# Patient Record
Sex: MALE | Race: WHITE
[De-identification: names, ages, dates, MRNs, and addresses within clinical notes are randomized per-mention and may not be internally consistent; named-entity substitution may affect disease eponyms.]

---

## 2017-03-24 NOTE — EDM.PDOC
[id number]   4d


LEFT SIDE ABD PAIN


Time Seen by Provider: 03/24/17 16:50


Source of Information: Reports: Patient, Family


History Limitations: Reports: No limitations





- History of Present Illness


INITIAL COMMENTS - FREE TEXT/NARRATIVE: 





81-year-old male arrives with acute pain in his lower abdomen and left flank.  

It started within the last 2 hours.  He has nausea and vomiting.  No fevers or 

chills, no previous symptoms.


Onset: sudden


Duration: Hour(s): (Within the last 2 hours)


Location: Reports: abdomen (Left side), back


Severity: severe


Improves with: Reports: None


Worsens with: Reports: None


Associated Symptoms: Reports: nausea/vomiting.  Denies: shortness of breath





- Related Data


 Allergies











Allergy/AdvReac Type Severity Reaction Status Date / Time


 


atorvastatin calcium Allergy  Other Verified 05/20/15 08:10





[From Lipitor]     


 


codeine Allergy  Anaphylactic Verified 04/15/15 08:02





   Shock  


 


influenza virus vaccine, Allergy  Cannot Verified 04/15/15 08:02





specific   Remember  





[Influenza Virus     





Vacc,Specific]     


 


perfumes Allergy  Other Uncoded 05/20/15 08:10











Home Meds: 


 Home Meds





Finasteride [Proscar] 5 mg PO DAILY 08/19/14 [History]


Pioglitazone HCl 30 mg PO ACBRKBED 08/19/14 [History]


Simvastatin [Zocor] 80 mg PO DAILY 08/19/14 [History]


Aspirin 1 tab PO 03/24/17 [History]











Past Medical History


Other HEENT History: wears glasses





- Past Surgical History


Other Musculoskeletal Surgeries/Procedures:: bilateral tendons replaced on knees





Social & Family History





- Tobacco Use


Smoking Status *Q: Never Smoker


Years of Tobacco use: 32


Second Hand Smoke Exposure: No





- Alcohol Use


Days Per Week of Alcohol Use: 0





- Recreational Drug Use


Recreational Drug Use: No





ED ROS GENERAL





- Review of Systems


Review Of Systems: See Below


Constitutional: Denies: fever, chills


HEENT: Reports: No symptoms


Respiratory: Denies: Shortness of Breath


Cardiovascular: Denies: Chest pain


GI/Abdominal: Reports: Abdominal pain, Nausea, Vomiting


Skin: Reports: no symptoms


Neurological: Reports: No Symptoms


Psychiatric: Reports: Anxiety





ED EXAM, GENERAL





- Physical Exam


Exam: See Below


Exam Limited By: No limitations


General Appearance: alert, anxious, moderate distress


Eye Exam: bilateral eye: normal inspection


Respiratory/Chest: no respiratory distress, lungs clear


GI/Abdominal: soft, non tender


Neurological: alert, oriented


Psychiatric: anxious


Skin Exam: Warm, Dry





Course





- Vital Signs


Last Recorded V/S: 


 Last Vital Signs











Temp  98.6 F   03/24/17 16:46


 


Pulse  58 L  03/24/17 16:46


 


Resp  15   03/24/17 16:46


 


BP  164/77 H  03/24/17 16:46


 


Pulse Ox  96   03/24/17 16:46














- Orders/Labs/Meds


Orders: 


 Active Orders 24 hr











 Category Date Time Status


 


 Abdomen Pelvis wo Cont [CT] Stat Exams  03/24/17 16:30 Taken











Meds: 


Medications














Discontinued Medications














Generic Name Dose Route Start Last Admin





  Trade Name Freq  PRN Reason Stop Dose Admin


 


Sodium Chloride  1,000 mls @ 500 mls/hr  03/24/17 16:30  03/24/17 16:55





  Normal Saline  IV   500 mls/hr





  ASDIRECTED KIRSTIE   Administration


 


Ketorolac Tromethamine  30 mg  03/24/17 16:30  03/24/17 16:54





  Toradol  IVPUSH  03/24/17 16:31  30 mg





  ONETIME ONE   Administration


 


Ondansetron HCl  4 mg  03/24/17 16:34  03/24/17 16:53





  Zofran  IVPUSH  03/24/17 16:35  4 mg





  ONETIME ONE   Administration


 


Ondansetron HCl  Confirm  03/24/17 16:34  





  Zofran  Administered  03/24/17 16:35  





  Dose   





  4 mg   





  .ROUTE   





  .STK-MED ONE   














- Re-Assessments/Exams


Free Text/Narrative Re-Assessment/Exam: 





03/24/17 17:40


This patient's symptoms appeared to be classic for a renal stone.  1 L of fluid 

was started, 30 mg of Toradol IV given, and the patient was sent back for a CT 

of his abdomen and pelvis without contrast.  This confirmed a very small 1-2 mm 

distal left ureteral stone which likely has passed this patient's symptoms 

resolved.  Patient was observed for another 30 minutes and symptoms did not 

return.  He was given 6 hydrocodone to take home in case symptoms started 

returning, encouraged to drink a lot of water and return if needed.





Departure





- Departure


Time of Disposition: 17:47


Disposition: Home, Self-Care 01


Condition: good


Clinical Impression: 


 Ureteric colic





Instructions:  Kidney Stones, Easy-to-Read


Referrals: 


Ranulfo Springer PA-C [Primary Care Provider] - 


Forms:  ED Department Discharge


Care Plan Goals: 


Drink lots of water to prevent further stones from developing, take ibuprofen 

along with hydrocodone for pain if pain starts to recur and return anytime if 

worsening or concerns





- My Orders


Last 24 Hours: 


My Active Orders





03/24/17 16:30


Abdomen Pelvis wo Cont [CT] Stat 














- Assessment/Plan


Last 24 Hours: 


My Active Orders





03/24/17 16:30


Abdomen Pelvis wo Cont [CT] Stat

## 2021-05-01 ENCOUNTER — HOSPITAL ENCOUNTER (EMERGENCY)
Dept: HOSPITAL 11 - JP.ED | Age: 86
Discharge: HOME | End: 2021-05-01
Payer: MEDICARE

## 2021-05-01 VITALS — HEART RATE: 84 BPM | DIASTOLIC BLOOD PRESSURE: 61 MMHG | SYSTOLIC BLOOD PRESSURE: 170 MMHG

## 2021-05-01 DIAGNOSIS — Z88.5: ICD-10-CM

## 2021-05-01 DIAGNOSIS — Z88.7: ICD-10-CM

## 2021-05-01 DIAGNOSIS — S00.81XA: ICD-10-CM
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
## 2021-05-01 DIAGNOSIS — S00.01XA: ICD-10-CM

## 2021-05-01 DIAGNOSIS — Z79.899: ICD-10-CM

## 2021-05-01 DIAGNOSIS — Z91.048: ICD-10-CM

## 2021-05-01 DIAGNOSIS — Z79.82: ICD-10-CM

## 2021-05-01 DIAGNOSIS — S60.211A: Primary | ICD-10-CM

## 2021-05-01 DIAGNOSIS — Z88.8: ICD-10-CM

## 2021-05-01 NOTE — EDM.PDOC
ED HPI GENERAL MEDICAL PROBLEM





- General


Chief Complaint: Head Injury


Stated Complaint: FELL HIT FOREHEAD AND RIGHT WRIST


Time Seen by Provider: 05/01/21 15:00


Source of Information: Reports: Patient, Family


History Limitations: Reports: No Limitations





- History of Present Illness


INITIAL COMMENTS - FREE TEXT/NARRATIVE: 





85-year-old male was walking out of a local restaurant when he stumbled and 

tripped, scraping his right forehead and lateral eyebrow on the ground and 

jamming his right wrist into the ground as well.  No loss of consciousness, no 

nausea or vomiting but is got a significant scrape on his right lateral forehead

and pain in his wrist that he is concerned about.  He is able to ambulate 

without difficulty, no visual concerns, no headache.  His tetanus is current.


Onset: Sudden


Duration: Hour(s): (1 hour ago)


Location: Reports: Head, Face, Upper Extremity, Right


Associated Symptoms: Reports: No Other Symptoms, Other (Chronic back pain).  

Denies: Confusion, Chest Pain, Cough, Malaise, Nausea/Vomiting, Shortness of 

Breath, Weakness





- Related Data


                                    Allergies











Allergy/AdvReac Type Severity Reaction Status Date / Time


 


atorvastatin calcium Allergy  Other Verified 05/20/15 08:10





[From Lipitor]     


 


codeine Allergy  Anaphylactic Verified 04/15/15 08:02





   Shock  


 


influenza virus vaccine, Allergy  Cannot Verified 04/15/15 08:02





specific   Remember  





[Influenza Virus     





Vacc,Specific]     


 


perfumes Allergy  Other Uncoded 05/20/15 08:10











Home Meds: 


                                    Home Meds





Pioglitazone HCl 30 mg PO ACBRKBED 08/19/14 [History]


Simvastatin [Zocor] 80 mg PO DAILY 08/19/14 [History]


Aspirin 1 tab PO DAILY 03/24/17 [History]











Past Medical History


Other HEENT History: wears glasses





- Past Surgical History


Other Musculoskeletal Surgeries/Procedures:: bilateral tendons replaced on knees





ED ROS GENERAL





- Review of Systems


Review Of Systems: See Below


Constitutional: Denies: Fever, Chills


Respiratory: Denies: Shortness of Breath


Cardiovascular: Denies: Chest Pain


GI/Abdominal: Denies: Nausea, Vomiting


Musculoskeletal: Reports: Back Pain, Other (Right wrist pain)


Neurological: Reports: Other (See HPI)





ED EXAM, HEAD INJURY





- Physical Exam


Exam: See Below


Exam Limited By: No Limitations


General Appearance: Alert, No Apparent Distress


Head: Other (Patient has a 2 x 4 cm fairly deep abrasion above and to the right 

of the right eyebrow, but no laceration.  A few very superficial abrasions 

around the cheeks bilaterally.)


Eyes: Bilateral Eye: EOMI, Normal Inspection


Neck: Non-Tender


Respiratory: No Respiratory Distress


Extremities: Other (Patient has ecchymosis around the knuckle of the middle 

finger on the right hand but no bony tenderness.  He does have bony tenderness 

to palpation over the distal radius but no deformity.)


Neurologic: No Motor/Sensory Deficits, Normal Mood/Affect, Oriented x 3





- [place] Coma Score


Best Eye Response ([place]): (4) Open Spontaneously


Best Verbal Response ([place]): (5) Oriented


Best Motor Response ([place]): (6) Obeys Commands





Course





- Vital Signs


Last Recorded V/S: 


                                Last Vital Signs











Temp  97.6 F   05/01/21 14:56


 


Pulse  84   05/01/21 14:56


 


Resp  20   05/01/21 14:56


 


BP  170/61 H  05/01/21 14:56


 


Pulse Ox  95   05/01/21 14:56














- Orders/Labs/Meds


Orders: 


                               Active Orders 24 hr











 Category Date Time Status


 


 Wrist Comp Min 3V Rt [CR] Stat Exams  05/01/21 15:09 Taken











Meds: 


Medications














Discontinued Medications














Generic Name Dose Route Start Last Admin





  Trade Name Blaire  PRN Reason Stop Dose Admin


 


Bacitracin  1 dose  05/01/21 15:08  05/01/21 15:16





  Bacitracin Oint 1 Gm U/D Packet  TOP  05/01/21 15:09  1 dose





  ONETIME ONE   Administration














- Re-Assessments/Exams


Free Text/Narrative Re-Assessment/Exam: 





05/01/21 17:19


An x-ray of the right wrist was obtained that shows no fracture.  His tetanus is

 current.  The deeper abrasion on his right forehead was covered with bacitracin

 and dressings were applied.  Encouraged him to wrap his right wrist for support

 but there is no fracture, he should increase activity as tolerated.





Departure





- Departure


Time of Disposition: 15:36


Disposition: Home, Self-Care 01


Clinical Impression: 


Scalp abrasion


Qualifiers:


 Encounter type: initial encounter Qualified Code(s): S00.01XA - Abrasion of 

scalp, initial encounter





Contusion of right wrist


Qualifiers:


 Encounter type: initial encounter Qualified Code(s): S60.211A - Contusion of 

right wrist, initial encounter








- Discharge Information


Instructions:  Hand Contusion, Easy-to-Read, Abrasion, Easy-to-Read


Referrals: 


PCP,None [Primary Care Provider] - 


Forms:  ED Department Discharge


Care Plan Goals: 


Increase activity as tolerated and keep wounds clean while healing.  Return 

anytime if worsening or concerns.





Sepsis Event Note (ED)





- Focused Exam


Vital Signs: 


                                   Vital Signs











  Temp Pulse Resp BP Pulse Ox


 


 05/01/21 14:56  97.6 F  84  20  170/61 H  95


 


 05/01/21 14:51  97.6 F  84  20  170/61 H  95














- My Orders


Last 24 Hours: 


My Active Orders





05/01/21 15:09


Wrist Comp Min 3V Rt [CR] Stat 














- Assessment/Plan


Last 24 Hours: 


My Active Orders





05/01/21 15:09


Wrist Comp Min 3V Rt [CR] Stat

## 2021-05-03 NOTE — CR
Wrist Comp Min 3V Rt

 

CLINICAL HISTORY: Pain, fall

 

FINDINGS: There is no acute fracture or dislocation within the right wrist.

There is osteoarthritic change most prominent at the first and second

carpometacarpal junctions. There is joint laxity at the first.

 

Impression: No fracture.

 

Moderate osteoarthritic change

## 2021-07-15 ENCOUNTER — HOSPITAL ENCOUNTER (EMERGENCY)
Dept: HOSPITAL 11 - JP.ED | Age: 86
Discharge: HOME | End: 2021-07-15
Payer: MEDICARE

## 2021-07-15 VITALS — SYSTOLIC BLOOD PRESSURE: 181 MMHG | DIASTOLIC BLOOD PRESSURE: 77 MMHG | HEART RATE: 92 BPM
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
## 2021-07-15 DIAGNOSIS — Z88.8: ICD-10-CM

## 2021-07-15 DIAGNOSIS — S40.012A: ICD-10-CM

## 2021-07-15 DIAGNOSIS — S51.012A: Primary | ICD-10-CM
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
## 2021-07-15 DIAGNOSIS — Z88.5: ICD-10-CM

## 2021-07-15 DIAGNOSIS — S20.212A: ICD-10-CM

## 2021-07-15 DIAGNOSIS — Z87.891: ICD-10-CM

## 2021-07-15 DIAGNOSIS — Z79.82: ICD-10-CM

## 2021-07-15 DIAGNOSIS — Z91.048: ICD-10-CM

## 2021-07-15 DIAGNOSIS — S60.212A: ICD-10-CM

## 2021-07-15 DIAGNOSIS — I10: ICD-10-CM

## 2021-07-15 DIAGNOSIS — Z88.7: ICD-10-CM

## 2021-07-15 PROCEDURE — 73110 X-RAY EXAM OF WRIST: CPT

## 2021-07-15 PROCEDURE — 99284 EMERGENCY DEPT VISIT MOD MDM: CPT

## 2021-07-15 PROCEDURE — 99283 EMERGENCY DEPT VISIT LOW MDM: CPT

## 2021-07-15 PROCEDURE — 73030 X-RAY EXAM OF SHOULDER: CPT

## 2021-07-15 PROCEDURE — 71101 X-RAY EXAM UNILAT RIBS/CHEST: CPT

## 2021-07-15 NOTE — EDM.PDOC
ED HPI GENERAL MEDICAL PROBLEM





- General


Chief Complaint: General


Stated Complaint: MEDICAL VIA NORTH


Time Seen by Provider: 07/15/21 18:00


Source of Information: Reports: Patient, EMS


History Limitations: Reports: No Limitations





- History of Present Illness


INITIAL COMMENTS - FREE TEXT/NARRATIVE: 





Patient presents emergency room today status post a fall that occurred at home. 

Patient states that this morning he was gathering some trash on his back porch 

he states that his back porch does not have any rails and is about 2 feet off 

the ground he stepped off the side of the porch and fell onto his left side.  

Patient states that he initially laid there for a few minutes was a little dazed

did not lose consciousness blackout pass out or hit his head he said he landed 

on his left side although he is not really sure exactly how he landed.  Patient 

does state that he has left wrist pain as well as left rib cage and discomfort. 

He states he did okay was able to get up [facility] is scheduled then went on out to the [facility] to dispose of the 

trash she had been gathering him back [facility] and 

when on home said he was able to help his wife get out of the car get back into 

the house but then after that he had increasing pain and muscle spasm and was 

not able to do anything he says that around 1:00 is when he noticed all of that 

because of the continued pain and discomfort he called EMS to come to the 

emergency room for further evaluation.  Of note patient has a resolving 

ecchymosis to his right forehead he states that he fell off the stool at the 

[employer] couple weeks ago and he was seen at that time for that issue she

states that pain is approximately 6-8 out of 10 with movement at rest he says 

that is markedly improved although not gone away completely he does not want to 

use his left upper extremity.





PMH--DM2 (no insulin), HLP, GERD, Obesity, hx TIAs, history of benign positional

vertigo


Meds--pioglitazone omeprazole simvastatin and aspirin


Allergies--Lipitor codeine influenza vaccine and perfumes





Tob--former


EtOH/Drug--denies





COVID immunization--second dose received in April 2021


Onset: Today


Onset Date: 07/15/21


Onset Time: 11:00


  ** Left Wrist


Pain Score (Numeric/FACES): 3





  ** Left Thoracic


Pain Score (Numeric/FACES): 3





- Related Data


                                    Allergies











Allergy/AdvReac Type Severity Reaction Status Date / Time


 


atorvastatin calcium Allergy  Other Verified 07/15/21 18:14





[From Lipitor]     


 


codeine Allergy  Anaphylactic Verified 07/15/21 18:14





   Shock  


 


influenza virus vaccine, Allergy  Cannot Verified 07/15/21 18:14





specific   Remember  





[Influenza Virus     





Vacc,Specific]     


 


perfumes Allergy  Other Uncoded 07/15/21 18:14











Home Meds: 


                                    Home Meds





Pioglitazone HCl 30 mg PO ACBRKBED 08/19/14 [History]


Simvastatin [Zocor] 80 mg PO DAILY 08/19/14 [History]


Aspirin 1 tab PO DAILY 03/24/17 [History]


Omeprazole 40 mg PO ASDIRECTED PRN 07/15/21 [History]











Past Medical History


Other HEENT History: wears glasses





- Past Surgical History


Musculoskeletal Surgical History: Reports: Other (See Below)


Other Musculoskeletal Surgeries/Procedures:: bilateral tendons replaced on knees





Social & Family History





- Tobacco Use


Tobacco Use Status *Q: Former Tobacco User


Used Tobacco, but Quit: Yes


Month/Year Tobacco Last Used: 1983





- Caffeine Use


Caffeine Use: Reports: None





- Recreational Drug Use


Recreational Drug Use: No





ED ROS GENERAL





- Review of Systems


Review Of Systems: Comprehensive ROS is negative, except as noted in HPI.


Constitutional: Reports: No Symptoms


HEENT: Reports: No Symptoms


Respiratory: Reports: No Symptoms


Cardiovascular: Reports: No Symptoms


GI/Abdominal: Reports: No Symptoms


Musculoskeletal: Reports: Shoulder Pain, Arm Pain, Hand Pain, Muscle Stiffness


Skin: Reports: Wound (skin tear left elbow/forearm area)


Neurological: Reports: No Symptoms





ED EXAM, GENERAL





- Physical Exam


Exam: See Below


Exam Limited By: No Limitations


General Appearance: Alert, WD/WN, Moderate Distress


Eye Exam: Bilateral Eye: EOMI, Normal Inspection, PERRL


Ears: Normal External Exam


Nose: Normal Inspection


Throat/Mouth: Normal Inspection, Normal Voice, No Airway Compromise


Head: Atraumatic, Normocephalic, Other (Has noted resolving ecchymosis on the 

right forehead area from a previous fall couple weeks ago)


Neck: Normal Inspection, Supple, Non-Tender, Full Range of Motion


Respiratory/Chest: No Respiratory Distress, Lungs Clear, Normal Breath Sounds.  

No: Chest Non-Tender (With noted anterior left and left lateral chest wall 

tenderness to palpatory exam)


Cardiovascular: Normal Peripheral Pulses, Regular Rate, Rhythm, No Murmur


Peripheral Pulses: 2+: Radial (L), Radial (R)


GI/Abdominal: Normal Bowel Sounds, Soft, Non-Tender, Other (Noted for morbid 

abdominal obesity).  No: No Distention


 (Male) Exam: Deferred


Rectal (Males) Exam: Deferred


Back Exam: Normal Inspection, Full Range of Motion, Other (Patient with left-

sided scapula tenderness to palpatory exam).  No: Paraspinal Tenderness, 

Vertebral Tenderness


Extremities: Normal Capillary Refill, Joint Swelling (Left wrist swelling 

decreased motion as well as noted tenderness), Limited Range of Motion (Left 

shoulder and wrist elbow on the left side is without any restriction in motion 

no pain or discomfort)


Neurological: Alert, Oriented, CN II-XII Intact, Normal Cognition, No 

Motor/Sensory Deficits


Psychiatric: Normal Affect, Normal Mood


Skin Exam: Warm, Dry, Normal Color, Other (Skin tear to left elbow forearm area 

approximately 2 cm in size)





Course





- Vital Signs


Text/Narrative:: 





1919--many readings of plain films to include rib series there is no acute rib 

fracture noted no pneumothorax is noted, left shoulder there is no fracture or 

dislocation noted, left wrist there is sclerotic changes versus a distal radius 

avulsion fracture.  Final radiology reading of these films are pending at this 

time





1920--discussed with patient's today's ER findings to include recommendation for

 anti-inflammatory ice or heat as he finds comfortable and muscle relaxants.  

Will place a left wrist splint if Velcro wrist splint is available we will 

utilize that otherwise we will fabricate one in the emergency room today.  

Patient verbalized understanding regarding films in the radiology will follow up

 with a final reading which may change any plan of care.  It is recommended that

 he follow-up with his primary care provider in the next 2 to 3 days in regards 

to these films and any ongoing care needs.  He may want to call tomorrow to his 

primary care provider to find out final radiology report before the weekend.  

Skin tear care will include antibiotic ointment and cover with a nonstick 

bandage or dressing recommend limited use of adhesive bandages secondary to 

further skin tear proper probability.  Did discuss with patient care on his 

porch may want to have railings put up to decrease further fall potential 

durations patient verbalized understanding agreement will be ready for discharge

 shortly


Last Recorded V/S: 


                                Last Vital Signs











Temp  97.9 F   07/15/21 18:12


 


Pulse  87   07/15/21 18:12


 


Resp  20   07/15/21 18:12


 


BP  178/79 H  07/15/21 18:12


 


Pulse Ox  97   07/15/21 18:12














- Orders/Labs/Meds


Orders: 


                               Active Orders 24 hr











 Category Date Time Status


 


 Ribs 3V wo Chest Lt [CR] Stat Exams  07/15/21 18:27 Taken


 


 Shoulder Comp Lt [CR] Stat Exams  07/15/21 18:25 Taken


 


 Wrist Comp Min 3V Lt [CR] Stat Exams  07/15/21 18:26 Taken











Meds: 


Medications














Discontinued Medications














Generic Name Dose Route Start Last Admin





  Trade Name Blaire  PRN Reason Stop Dose Admin


 


Ketorolac Tromethamine  10 mg  07/15/21 18:35 





  Ketorolac 10 Mg Tab  PO  07/15/21 18:36 





  ONETIME ONE  


 


Methocarbamol  1,000 mg  07/15/21 18:35 





  Methocarbamol 500 Mg Tab  PO  07/15/21 18:36 





  ONETIME ONE  














Departure





- Departure


Time of Disposition: 19:34


Disposition: Home, Self-Care 01


Clinical Impression: 


 Fall (on) (from) other stairs and steps, initial encounter, Contusion of rib on

 left side, Contusion of left shoulder, initial encounter, Wrist contusion, Left

 wrist injury, Skin tear of left elbow without complication, Hypertension








- Discharge Information


*PRESCRIPTION DRUG MONITORING PROGRAM REVIEWED*: Not Applicable


*COPY OF PRESCRIPTION DRUG MONITORING REPORT IN PATIENT TRUE: Not Applicable


Instructions:  Skin Tear, Easy-to-Read, Fall Prevention in the Home, Adult, 

Contusion, Easy-to-Read, How to Use Cold Therapy


Referrals: 


PCP,None [Primary Care Provider] - 


Forms:  ED Department Discharge


Additional Instructions: 


You may use over the counter naproxen (Alleve) as per label, additional measures

include ice or heat as you find helpful, athletic rubs such as artem-

cardenas/biofreeze/icy hot/aspercream.  You may want to try Salonpas (lidocaine 

patches) available over the counter to left rib area for pain--wear patch(s) for

12 hours then remove for 12 hours





Skin tear should be covered with antibiotic ointment and nonstick dressing 

(avoid adhesive tapes/bandages as this may cause further skin damage)-but wrap 

with kerlex or coban--ensure to remove dressing daily for bathing and re-dress 

afterward





Wear left wrist splint until otherwise directed by family doctor in ER follow up

care--may remove for shower/bathing





Follow up tomorrow with your family doctor regarding radiology final results and

ER follow up care





Sepsis Event Note (ED)





- Evaluation


Sepsis Screening Result: No Definite Risk





- Focused Exam


Vital Signs: 


                                   Vital Signs











  Temp Pulse Resp BP Pulse Ox


 


 07/15/21 18:12  97.9 F  87  20  178/79 H  97














- My Orders


Last 24 Hours: 


My Active Orders





07/15/21 18:25


Shoulder Comp Lt [CR] Stat 





07/15/21 18:26


Wrist Comp Min 3V Lt [CR] Stat 





07/15/21 18:27


Ribs 3V wo Chest Lt [CR] Stat 














- Assessment/Plan


Last 24 Hours: 


My Active Orders





07/15/21 18:25


Shoulder Comp Lt [CR] Stat 





07/15/21 18:26


Wrist Comp Min 3V Lt [CR] Stat 





07/15/21 18:27


Ribs 3V wo Chest Lt [CR] Stat

## 2021-07-16 NOTE — CR
Wrist Comp Min 3V Lt

 

CLINICAL HISTORY: Fall

 

FINDINGS: There is a fracture of the distal radius with articular surface

involvement. There is osteoarthritis in the thumb

 

Impression: Nondisplaced distal radial fracture

## 2021-07-16 NOTE — CR
Shoulder Comp Lt, Ribs 3V wo Chest Lt

 

CLINICAL HISTORY: Fall

 

FINDINGS: There is no acute fracture or dislocation in the left shoulder. There

is some degenerative change in the AC joint

 

Impression: No fracture or dislocation

 

Ribs 3V wo Chest Lt

 

CLINICAL HISTORY: Fall

 

FINDINGS: There is a minimal cortical step-off of the lateral left sixth rib

which is suspect for nondisplaced fracture. There is no pleural effusion or

pneumothorax

 

IMPRESSION: Suspect nondisplaced fracture left sixth rib

## 2022-03-29 ENCOUNTER — HOSPITAL ENCOUNTER (EMERGENCY)
Dept: HOSPITAL 11 - JP.ED | Age: 87
Discharge: HOME | End: 2022-03-29
Payer: MEDICARE

## 2022-03-29 VITALS — DIASTOLIC BLOOD PRESSURE: 63 MMHG | SYSTOLIC BLOOD PRESSURE: 169 MMHG | HEART RATE: 91 BPM

## 2022-03-29 DIAGNOSIS — Z23: ICD-10-CM

## 2022-03-29 DIAGNOSIS — E78.00: ICD-10-CM

## 2022-03-29 DIAGNOSIS — Z88.8: ICD-10-CM

## 2022-03-29 DIAGNOSIS — S61.411A: Primary | ICD-10-CM

## 2022-03-29 DIAGNOSIS — Z88.7: ICD-10-CM

## 2022-03-29 DIAGNOSIS — Z88.5: ICD-10-CM

## 2022-03-29 DIAGNOSIS — Z79.899: ICD-10-CM

## 2022-03-29 DIAGNOSIS — Z87.891: ICD-10-CM

## 2022-03-29 DIAGNOSIS — Z79.82: ICD-10-CM

## 2022-03-29 DIAGNOSIS — E11.22: ICD-10-CM
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
## 2022-03-29 DIAGNOSIS — K21.9: ICD-10-CM

## 2022-03-29 DIAGNOSIS — N18.9: ICD-10-CM

## 2022-03-29 DIAGNOSIS — Z91.048: ICD-10-CM

## 2022-04-26 ENCOUNTER — HOSPITAL ENCOUNTER (OUTPATIENT)
Dept: HOSPITAL 11 - JP.SDS | Age: 87
Discharge: HOME | End: 2022-04-26
Attending: FAMILY MEDICINE
Payer: MEDICARE

## 2022-04-26 VITALS — HEART RATE: 60 BPM | DIASTOLIC BLOOD PRESSURE: 61 MMHG | SYSTOLIC BLOOD PRESSURE: 153 MMHG

## 2022-04-26 DIAGNOSIS — I25.10: ICD-10-CM

## 2022-04-26 DIAGNOSIS — K52.9: Primary | ICD-10-CM

## 2022-04-26 DIAGNOSIS — E11.9: ICD-10-CM

## 2022-04-26 DIAGNOSIS — E78.5: ICD-10-CM

## 2022-04-26 DIAGNOSIS — K64.4: ICD-10-CM
